# Patient Record
(demographics unavailable — no encounter records)

---

## 2019-01-01 NOTE — NUR
1130- Rectal temp 97.6. Infant swaddled in blankets from warmer.
1150- Rectal temp 97.7. Infant placed on warmer in labor room with temp probe
in place.
1230- 99.1 Ax, infant wrapped and handed to mother to hold.

## 2019-01-01 NOTE — NUR
BABY BOY DELIVERED  ASSISTED BY DR. CAMPBELL AT 1029. NC X2 REDUCED PRIOR TO
DELIVERY OF BODY. BABY VIGOROUS AND CRYING. BABY TAKEN TO WARMER PER MOTHER'S
REQUEST. WEIGHT/MEASUREMENTS OBTAINED. MEDICATIONS GIVEN. ASSESSMENT
COMPLETED. ID BANDS PLACED ON BABY X2 AND FOOTPRINTS OBTAINED. BABY THEN
PLACED SKIN TO SKIN WITH MOTHER.

## 2019-01-01 NOTE — NUR
SW recieved consult for mother and baby due to the circumstances surrounding
the parents other children. Parent reports that her oldest child was murdered
and the second child she gave up her parental rights. Patient reports that she
 and was in room with patient with the patient's permission. Patient
reports that she and her spouse have not issue being able to take baby home.
SW provided patient with the a resource packet. Patient very guarded in her
communincation about her situation. Father is Gerald Dumont [phone]
and Mother Juliann [phone]. Reports they live in [facility]. Patient
only had two prenatal visits with PCP.